# Patient Record
Sex: FEMALE | Race: WHITE
[De-identification: names, ages, dates, MRNs, and addresses within clinical notes are randomized per-mention and may not be internally consistent; named-entity substitution may affect disease eponyms.]

---

## 2019-06-14 ENCOUNTER — HOSPITAL ENCOUNTER (EMERGENCY)
Dept: HOSPITAL 47 - EC | Age: 34
Discharge: HOME | End: 2019-06-14
Payer: COMMERCIAL

## 2019-06-14 VITALS — TEMPERATURE: 97.8 F

## 2019-06-14 VITALS — SYSTOLIC BLOOD PRESSURE: 113 MMHG | HEART RATE: 95 BPM | RESPIRATION RATE: 18 BRPM | DIASTOLIC BLOOD PRESSURE: 73 MMHG

## 2019-06-14 DIAGNOSIS — G43.909: Primary | ICD-10-CM

## 2019-06-14 DIAGNOSIS — Z53.29: ICD-10-CM

## 2019-06-14 DIAGNOSIS — F41.9: ICD-10-CM

## 2019-06-14 DIAGNOSIS — Z79.899: ICD-10-CM

## 2019-06-14 PROCEDURE — 96365 THER/PROPH/DIAG IV INF INIT: CPT

## 2019-06-14 PROCEDURE — 96375 TX/PRO/DX INJ NEW DRUG ADDON: CPT

## 2019-06-14 PROCEDURE — 96361 HYDRATE IV INFUSION ADD-ON: CPT

## 2019-06-14 PROCEDURE — 99284 EMERGENCY DEPT VISIT MOD MDM: CPT

## 2019-06-14 NOTE — ED
Headache HPI





- General


Chief Complaint: Headache


Stated Complaint: migraine


Time Seen by Provider: 06/14/19 16:52


Source: RN notes reviewed, old records reviewed


Mode of arrival: ambulatory


Limitations: no limitations





- History of Present Illness


Initial Comments: 





This is a 32-year-old female the ER for evaluation she presents today for 

evaluation of headache.  History of migraines ER visits positive for migraines. 

No recent trauma no fevers.  No neck pain no pain in her back.  No neurological 

complaints.  Patient states the headache is just been progressive with nausea 

and photophobia just like her prior migraines


MD Complaint: headache, "migraine"


-: hour(s)


Onset Description: gradual


Location: frontal


Severity: moderate


Severity scale (1-10): 4


Quality: aching, throbbing


Consistency: intermittent


Improves With: nothing


Worsens With: none


Associated Symptoms: photophobia


Treatments Prior to Arrival: none





- Related Data


On Hormonal Birth Control: No


                                Home Medications











 Medication  Instructions  Recorded  Confirmed


 


Dextroamphetamine/Amphetamine 20 mg PO BID@0800,1400 06/14/19 06/14/19





[Adderall]   


 


PARoxetine [Paxil] See Taper PO DAILY 06/14/19 06/14/19


 


SUMAtriptan SUCCINATE [Imitrex] 50 mg PO DAILY PRN 06/14/19 06/14/19


 


buPROPion XL [Wellbutrin Xl] 150 mg PO DAILY 06/14/19 06/14/19











                                    Allergies











Allergy/AdvReac Type Severity Reaction Status Date / Time


 


No Known Allergies Allergy   Verified 06/14/19 17:27














Review of Systems


ROS Statement: 


Those systems with pertinent positive or pertinent negative responses have been 

documented in the HPI.





ROS Other: All systems not noted in ROS Statement are negative.





Past Medical History


Additional Past Medical History / Comment(s): migraines


History of Any Multi-Drug Resistant Organisms: None Reported


Past Surgical History: No Surgical Hx Reported


Past Psychological History: Anxiety


Smoking Status: Never smoker


Past Alcohol Use History: None Reported


Past Drug Use History: None Reported





General Exam


Limitations: no limitations


General appearance: alert, in no apparent distress


Head exam: Present: atraumatic, normocephalic, normal inspection


Eye exam: Present: normal appearance, PERRL, EOMI.  Absent: scleral icterus, 

conjunctival injection, periorbital swelling


ENT exam: Present: normal exam, mucous membranes moist


Neck exam: Present: normal inspection.  Absent: tenderness, meningismus, 

lymphadenopathy


Respiratory exam: Present: normal lung sounds bilaterally.  Absent: respiratory 

distress, wheezes, rales, rhonchi, stridor


Cardiovascular Exam: Present: regular rate, normal rhythm, normal heart sounds. 

Absent: systolic murmur, diastolic murmur, rubs, gallop, clicks


GI/Abdominal exam: Present: soft, normal bowel sounds.  Absent: distended, 

tenderness, guarding, rebound, rigid


Extremities exam: Present: normal inspection, full ROM, normal capillary refill.

 Absent: tenderness, pedal edema, joint swelling, calf tenderness


Back exam: Present: normal inspection


Neurological exam: Present: alert, oriented X3, CN II-XII intact


Psychiatric exam: Present: normal affect, normal mood


Skin exam: Present: warm, dry, intact, normal color.  Absent: rash





Course


                                   Vital Signs











  06/14/19





  16:47


 


Temperature 97.8 F


 


Pulse Rate 111 H


 


Respiratory 20





Rate 


 


Blood Pressure 118/76


 


O2 Sat by Pulse 100





Oximetry 














- Reevaluation(s)


Reevaluation #1: 





06/14/19 19:36


Medical record is reviewed including prior ER visits for similar complaint


Reevaluation #2: 





06/14/19 19:36


Patient's headache is resolved





Medical Decision Making





- Medical Decision Making





32 female the ER for evaluation of headache.  No significant findings on 

physical exam here in the ER.  Patient can be discharged home with headache 

control currently





Disposition


Clinical Impression: 


 Migraine, Headache





Disposition: HOME SELF-CARE


Condition: Good


Instructions (If sedation given, give patient instructions):  Acute Headache 

(ED)


Is patient prescribed a controlled substance at d/c from ED?: No


Referrals: 


Nonstaff,Physician [Primary Care Provider] - 1-2 days

## 2019-07-21 ENCOUNTER — HOSPITAL ENCOUNTER (EMERGENCY)
Dept: HOSPITAL 47 - EC | Age: 34
LOS: 1 days | Discharge: HOME | End: 2019-07-22
Payer: COMMERCIAL

## 2019-07-21 DIAGNOSIS — F41.9: ICD-10-CM

## 2019-07-21 DIAGNOSIS — G43.909: Primary | ICD-10-CM

## 2019-07-21 DIAGNOSIS — Z79.899: ICD-10-CM

## 2019-07-21 PROCEDURE — 96361 HYDRATE IV INFUSION ADD-ON: CPT

## 2019-07-21 PROCEDURE — 96375 TX/PRO/DX INJ NEW DRUG ADDON: CPT

## 2019-07-21 PROCEDURE — 99283 EMERGENCY DEPT VISIT LOW MDM: CPT

## 2019-07-21 PROCEDURE — 36415 COLL VENOUS BLD VENIPUNCTURE: CPT

## 2019-07-21 PROCEDURE — 96374 THER/PROPH/DIAG INJ IV PUSH: CPT

## 2019-07-21 PROCEDURE — 84703 CHORIONIC GONADOTROPIN ASSAY: CPT

## 2019-07-22 VITALS
SYSTOLIC BLOOD PRESSURE: 127 MMHG | TEMPERATURE: 98 F | RESPIRATION RATE: 18 BRPM | DIASTOLIC BLOOD PRESSURE: 49 MMHG | HEART RATE: 75 BPM

## 2019-07-22 NOTE — ED
Headache HPI





- General


Mode of arrival: ambulatory


Limitations: no limitations





<Karley Mayorga - Last Filed: 07/22/19 04:41>





<Isabella Perez - Last Filed: 07/22/19 05:43>





- General


Chief Complaint: Headache


Stated Complaint: Migraine


Time Seen by Provider: 07/22/19 01:37





- History of Present Illness


Initial Comments: 


34-year-old female patient with past medical history significant for migraine 

headaches presents to the emergency department today for evaluation of headache.

 Patient states she's had this headache since Wednesday.  States that it is 

located on the left side of her head and her eye and ear.  States it is 

radiating down her neck.  States she has light and sound sensitivity with this. 

States she has been nauseated and did vomit one time today.  Denies any 

numbness, tingling or weakness to her extremities.  Denies blurred or double 

vision.  Patient states this is consistent with her usual migraine pattern and 

she denies any new symptoms.  She denies this being the worse headache of her 

life.  Patient states that she ran out of her Imitrex on Friday.  States she has

been taking Tylenol Motrin without relief. Patient denies any recent rash, 

fever, chills, shortness breath, chest pain, abdominal pain, nausea, diarrhea, 

constipation, back pain, hematuria, dysuria, urinary urgency, urinary frequency,

or any other complaints. (Karley Mayorga)





- Related Data


                                Home Medications











 Medication  Instructions  Recorded  Confirmed


 


Dextroamphetamine/Amphetamine 20 mg PO BID@0800,1400 06/14/19 06/14/19





[Adderall]   


 


PARoxetine [Paxil] See Taper PO DAILY 06/14/19 06/14/19


 


SUMAtriptan SUCCINATE [Imitrex] 50 mg PO DAILY PRN 06/14/19 06/14/19


 


buPROPion XL [Wellbutrin Xl] 150 mg PO DAILY 06/14/19 06/14/19











                                    Allergies











Allergy/AdvReac Type Severity Reaction Status Date / Time


 


No Known Allergies Allergy   Verified 06/14/19 17:27














Review of Systems


ROS Other: All systems not noted in ROS Statement are negative.





<Karley Mayorga - Last Filed: 07/22/19 04:41>


ROS Other: All systems not noted in ROS Statement are negative.





<Isabella Perez - Last Filed: 07/22/19 05:43>


ROS Statement: 


Those systems with pertinent positive or pertinent negative responses have been 

documented in the HPI.








Past Medical History


Additional Past Medical History / Comment(s): migraines


History of Any Multi-Drug Resistant Organisms: None Reported


Past Surgical History: No Surgical Hx Reported


Past Psychological History: Anxiety


Smoking Status: Never smoker


Past Alcohol Use History: Occasional


Past Drug Use History: None Reported





<Karley Mayorga - Last Filed: 07/22/19 04:41>





General Exam


Limitations: no limitations


General appearance: alert, in no apparent distress, other (Well-developed, well-

nourished adult female patient in no acute distress.  Vital signs upon 

presentation are temperature 98.1F, pulse 96, respirations 17, blood pressure 

115/79, pulse ox 98% on room air.)


Eye exam: Present: normal appearance, PERRL, EOMI.  Absent: scleral icterus, 

conjunctival injection, nystagmus, periorbital swelling


ENT exam: Present: normal exam, normal oropharynx, mucous membranes moist, TM's 

normal bilaterally


Respiratory exam: Present: normal lung sounds bilaterally.  Absent: respiratory 

distress, wheezes, rales, rhonchi, stridor


Cardiovascular Exam: Present: regular rate, normal rhythm, normal heart sounds. 

Absent: systolic murmur, diastolic murmur, rubs, gallop, clicks


GI/Abdominal exam: Present: soft, normal bowel sounds.  Absent: distended, 

tenderness, guarding, rebound, rigid


Neurological exam: Present: alert, oriented X3, CN II-XII intact, other 

(Strength in all 4 extremities is 5/5.)


Psychiatric exam: Present: normal affect, normal mood


Skin exam: Present: warm, dry, intact, normal color.  Absent: rash





<Karley Mayorga - Last Filed: 07/22/19 04:41>





Course





<Isabella Perez - Last Filed: 07/22/19 05:43>


                                   Vital Signs











  07/21/19





  23:59


 


Temperature 98.1 F


 


Pulse Rate 96


 


Respiratory 17





Rate 


 


Blood Pressure 115/79


 


O2 Sat by Pulse 98





Oximetry 














- Reevaluation(s)


Reevaluation #1: 





07/22/19 05:43


Patient reports feeling better and is ready for discharge home at this time 

(Isabella Perez)





Medical Decision Making





<Karley Mayorga - Last Filed: 07/22/19 04:41>





- Medical Decision Making





34-year-old female patient presented to the emergency department today for 

evaluation of migraine headache.  Patient has history of migraines and states 

her symptoms are consistent with her usual migraine pattern.  She denies this 

being the worse headache of her life.  She is neurologically intact with no 

focal deficits.  Patient was given IV fluids, Toradol, Reglan, and Benadryl.  

Upon reevaluation patient reports pain decreased somewhat but is still present. 

She is requesting something more for discomfort.  We added Tylenol, Decadron, 

and Imitrex. Care is handed over to my attending Dr. Perez who will monitor 

and follow until disposition.  (Karley Mayorga)





- Lab Data


                                   Lab Results











  07/22/19 Range/Units





  01:39 


 


HCG, Qual  Not Detected  














Disposition





<Karley Mayorga - Last Filed: 07/22/19 04:41>


Is patient prescribed a controlled substance at d/c from ED?: No





<Isabella Perez - Last Filed: 07/22/19 05:43>


Clinical Impression: 


 Headache





Disposition: HOME SELF-CARE


Condition: Stable


Instructions (If sedation given, give patient instructions):  Acute Headache 

(ED)


Referrals: 


Nonstaff,Physician [REFERRING] - 1-2 days

## 2019-09-25 ENCOUNTER — HOSPITAL ENCOUNTER (OUTPATIENT)
Dept: HOSPITAL 47 - RADMRIMAIN | Age: 34
Discharge: HOME | End: 2019-09-25
Attending: PSYCHIATRY & NEUROLOGY
Payer: COMMERCIAL

## 2019-09-25 DIAGNOSIS — M54.81: ICD-10-CM

## 2019-09-25 DIAGNOSIS — G43.009: Primary | ICD-10-CM

## 2019-09-25 PROCEDURE — 70551 MRI BRAIN STEM W/O DYE: CPT

## 2019-09-25 NOTE — MR
MR brain without contrast

 

HISTORY: Migraine, occipital neuralgia

 

Multiplanar multisequence imaging through the brain

 

Correlation to MR brain 11/12/2015

 

There is no restricted diffusion. There is no hemorrhage or hydrocephalus. Brain signal is stable. Th
ere are normal vascular flow voids. Inflammatory change present in the maxillary sinus right greater 
than left, ethmoid air cells and right frontal sinus. Orbits show symmetric appearance. The corpus ca
llosum, pituitary, cervical medullary junction, cerebellopontine angles are unremarkable.

 

IMPRESSION: Stable brain MRI. Sinus disease.

## 2020-02-14 ENCOUNTER — HOSPITAL ENCOUNTER (EMERGENCY)
Dept: HOSPITAL 47 - EC | Age: 35
Discharge: HOME | End: 2020-02-14
Payer: COMMERCIAL

## 2020-02-14 VITALS — DIASTOLIC BLOOD PRESSURE: 68 MMHG | RESPIRATION RATE: 16 BRPM | HEART RATE: 86 BPM | SYSTOLIC BLOOD PRESSURE: 103 MMHG

## 2020-02-14 VITALS — TEMPERATURE: 97.6 F

## 2020-02-14 DIAGNOSIS — R00.0: ICD-10-CM

## 2020-02-14 DIAGNOSIS — Z79.899: ICD-10-CM

## 2020-02-14 DIAGNOSIS — F41.9: ICD-10-CM

## 2020-02-14 DIAGNOSIS — Z53.8: ICD-10-CM

## 2020-02-14 DIAGNOSIS — R11.0: ICD-10-CM

## 2020-02-14 DIAGNOSIS — Z86.69: ICD-10-CM

## 2020-02-14 DIAGNOSIS — E86.0: Primary | ICD-10-CM

## 2020-02-14 DIAGNOSIS — N39.0: ICD-10-CM

## 2020-02-14 LAB
ALBUMIN SERPL-MCNC: 4.4 G/DL (ref 3.5–5)
ALP SERPL-CCNC: 58 U/L (ref 38–126)
ALT SERPL-CCNC: 13 U/L (ref 4–34)
ANION GAP SERPL CALC-SCNC: 10 MMOL/L
AST SERPL-CCNC: 21 U/L (ref 14–36)
BASOPHILS # BLD AUTO: 0 K/UL (ref 0–0.2)
BASOPHILS NFR BLD AUTO: 1 %
BUN SERPL-SCNC: 17 MG/DL (ref 7–17)
CALCIUM SPEC-MCNC: 9.5 MG/DL (ref 8.4–10.2)
CHLORIDE SERPL-SCNC: 107 MMOL/L (ref 98–107)
CO2 SERPL-SCNC: 21 MMOL/L (ref 22–30)
EOSINOPHIL # BLD AUTO: 0.1 K/UL (ref 0–0.7)
EOSINOPHIL NFR BLD AUTO: 2 %
ERYTHROCYTE [DISTWIDTH] IN BLOOD BY AUTOMATED COUNT: 4.4 M/UL (ref 3.8–5.4)
ERYTHROCYTE [DISTWIDTH] IN BLOOD: 12.3 % (ref 11.5–15.5)
GLUCOSE SERPL-MCNC: 132 MG/DL (ref 74–99)
HCT VFR BLD AUTO: 38.3 % (ref 34–46)
HGB BLD-MCNC: 12.7 GM/DL (ref 11.4–16)
KETONES UR QL STRIP.AUTO: (no result)
LYMPHOCYTES # SPEC AUTO: 0.9 K/UL (ref 1–4.8)
LYMPHOCYTES NFR SPEC AUTO: 23 %
MCH RBC QN AUTO: 28.9 PG (ref 25–35)
MCHC RBC AUTO-ENTMCNC: 33.2 G/DL (ref 31–37)
MCV RBC AUTO: 87 FL (ref 80–100)
MONOCYTES # BLD AUTO: 0.1 K/UL (ref 0–1)
MONOCYTES NFR BLD AUTO: 3 %
NEUTROPHILS # BLD AUTO: 2.9 K/UL (ref 1.3–7.7)
NEUTROPHILS NFR BLD AUTO: 69 %
PH UR: 8.5 [PH] (ref 5–8)
PLATELET # BLD AUTO: 310 K/UL (ref 150–450)
POTASSIUM SERPL-SCNC: 4.2 MMOL/L (ref 3.5–5.1)
PROT SERPL-MCNC: 7.1 G/DL (ref 6.3–8.2)
PROT UR QL: (no result)
RBC UR QL: 6 /HPF (ref 0–5)
SODIUM SERPL-SCNC: 138 MMOL/L (ref 137–145)
SP GR UR: 1.03 (ref 1–1.03)
SQUAMOUS UR QL AUTO: 15 /HPF (ref 0–4)
UROBILINOGEN UR QL STRIP: <2 MG/DL (ref ?–2)
WBC # BLD AUTO: 4.1 K/UL (ref 3.8–10.6)
WBC # UR AUTO: 20 /HPF (ref 0–5)

## 2020-02-14 PROCEDURE — 85025 COMPLETE CBC W/AUTO DIFF WBC: CPT

## 2020-02-14 PROCEDURE — 80053 COMPREHEN METABOLIC PANEL: CPT

## 2020-02-14 PROCEDURE — 36415 COLL VENOUS BLD VENIPUNCTURE: CPT

## 2020-02-14 PROCEDURE — 81025 URINE PREGNANCY TEST: CPT

## 2020-02-14 PROCEDURE — 87086 URINE CULTURE/COLONY COUNT: CPT

## 2020-02-14 PROCEDURE — 96374 THER/PROPH/DIAG INJ IV PUSH: CPT

## 2020-02-14 PROCEDURE — 81001 URINALYSIS AUTO W/SCOPE: CPT

## 2020-02-14 PROCEDURE — 99284 EMERGENCY DEPT VISIT MOD MDM: CPT

## 2020-02-14 PROCEDURE — 93005 ELECTROCARDIOGRAM TRACING: CPT

## 2020-02-14 PROCEDURE — 96375 TX/PRO/DX INJ NEW DRUG ADDON: CPT

## 2020-02-14 PROCEDURE — 96361 HYDRATE IV INFUSION ADD-ON: CPT

## 2020-02-14 NOTE — ED
Dizziness HPI





- General


Chief Complaint: Dizziness


Stated Complaint: dizziness


Time Seen by Provider: 02/14/20 11:55


Source: patient


Mode of arrival: ambulatory


Limitations: no limitations





- History of Present Illness


Initial Comments: 





Patient is a 34-year-old female presenting to emergency Department with 

complaints of feeling lightheaded as well as nauseous.  Patient denies any 

injury or trauma.  She states this started today.  She has history of migraines 

and does take Paxil and Wellbutrin.  She denies any pain including no chest 

pain, shortness of breath, abdominal pain.  She denies any diarrhea.  She does 

not believe she is pregnant as she just had her menstrual cycle.  She has no 

other complaints at this time.  Upon arrival to the ER, patient was tachycardia 

at 112, rest of vitals normal.





- Related Data


                                Home Medications











 Medication  Instructions  Recorded  Confirmed


 


Dextroamphetamine/Amphetamine 20 mg PO BID@0800,1400 06/14/19 06/14/19





[Adderall]   


 


PARoxetine [Paxil] See Taper PO DAILY 06/14/19 06/14/19


 


SUMAtriptan SUCCINATE [Imitrex] 50 mg PO DAILY PRN 06/14/19 06/14/19


 


buPROPion XL [Wellbutrin Xl] 150 mg PO DAILY 06/14/19 06/14/19








                                  Previous Rx's











 Medication  Instructions  Recorded


 


Cephalexin [Keflex] 500 mg PO BID 5 Days #10 cap 02/14/20











                                    Allergies











Allergy/AdvReac Type Severity Reaction Status Date / Time


 


No Known Allergies Allergy   Verified 02/14/20 11:51














Review of Systems


ROS Statement: 


Those systems with pertinent positive or pertinent negative responses have been 

documented in the HPI.





ROS Other: All systems not noted in ROS Statement are negative.





Past Medical History


Additional Past Medical History / Comment(s): migraines


History of Any Multi-Drug Resistant Organisms: None Reported


Past Surgical History: No Surgical Hx Reported


Past Psychological History: Anxiety


Smoking Status: Never smoker


Past Alcohol Use History: Occasional


Past Drug Use History: None Reported





General Exam





- General Exam Comments


Initial Comments: 





GENERAL: 


Well-appearing, well-nourished and in no acute distress.





HEAD: 


Atraumatic, normocephalic.





EYES:


Pupils equal round and reactive to light, extraocular movements intact, sclera 

anicteric, conjunctiva are normal.





ENT: 


TMs normal, nares patent, oropharynx clear without exudates.  Moist mucous 

membranes.





NECK: 


Normal range of motion, supple without lymphadenopathy or JVD.





LUNGS:


 Breath sounds clear to auscultation bilaterally and equal.  No wheezes rales or

 rhonchi.





HEART:


Regular rate and rhythm without murmurs, rubs or gallops.





ABDOMEN: 


Soft, nontender, normoactive bowel sounds.  No guarding, no rebound.  No masses 

appreciated.





: Deferred 





EXTREMITIES: 


Normal range of motion, no pitting or edema.  No clubbing or cyanosis.





NEUROLOGICAL: 


Normal speech, normal gait.





PSYCH:


Normal mood, normal affect.





SKIN:


 Warm, Dry, normal turgor, no rashes or lesions noted.


Limitations: no limitations





Course


                                   Vital Signs











  02/14/20 02/14/20





  11:51 14:39


 


Temperature 97.6 F 


 


Pulse Rate 112 H 86


 


Respiratory 18 16





Rate  


 


Blood Pressure 95/68 103/68


 


O2 Sat by Pulse 100 100





Oximetry  














EKG Findings





- EKG Comments:


EKG Findings:: Ventricular rate 85, DC interval 124, .  Normal sinus 

rhythm, prolonged QT, no acute ST segment changes.





Medical Decision Making





- Medical Decision Making





She is a 34-year-old female presenting with lightheadedness and nausea times 

today.  She was slightly tachycardia and arrival, afebrile.  She denies any pain

 or shortness of breath.  Patient's labs show no acute abnormalities, UA is 

significant for infection and dehydration.  She is not pregnant.  Patient was 

given a liter and half of fluids as well as Zofran and reports improvement in 

her symptoms.  Her vital signs have stabilized.  Patient was given Rocephin and 

will be started on Keflex as outpatient.  She will follow-up with PCP.  Urine 

culture is pending at this time.  She is stable for discharge and she is in 

agreement this plan of care.  Return parameters were discussed with the patient 

and she verbalized understanding case discussed with Dr. Wolff.





- Lab Data


Result diagrams: 


                                 02/14/20 12:18





                                 02/14/20 12:18


                                   Lab Results











  02/14/20 02/14/20 02/14/20 Range/Units





  12:18 12:18 13:03 


 


WBC   4.1   (3.8-10.6)  k/uL


 


RBC   4.40   (3.80-5.40)  m/uL


 


Hgb   12.7   (11.4-16.0)  gm/dL


 


Hct   38.3   (34.0-46.0)  %


 


MCV   87.0   (80.0-100.0)  fL


 


MCH   28.9   (25.0-35.0)  pg


 


MCHC   33.2   (31.0-37.0)  g/dL


 


RDW   12.3   (11.5-15.5)  %


 


Plt Count   310   (150-450)  k/uL


 


Neutrophils %   69   %


 


Lymphocytes %   23   %


 


Monocytes %   3   %


 


Eosinophils %   2   %


 


Basophils %   1   %


 


Neutrophils #   2.9   (1.3-7.7)  k/uL


 


Lymphocytes #   0.9 L   (1.0-4.8)  k/uL


 


Monocytes #   0.1   (0-1.0)  k/uL


 


Eosinophils #   0.1   (0-0.7)  k/uL


 


Basophils #   0.0   (0-0.2)  k/uL


 


Sodium  138    (137-145)  mmol/L


 


Potassium  4.2    (3.5-5.1)  mmol/L


 


Chloride  107    ()  mmol/L


 


Carbon Dioxide  21 L    (22-30)  mmol/L


 


Anion Gap  10    mmol/L


 


BUN  17    (7-17)  mg/dL


 


Creatinine  0.57    (0.52-1.04)  mg/dL


 


Est GFR (CKD-EPI)AfAm  >90    (>60 ml/min/1.73 sqM)  


 


Est GFR (CKD-EPI)NonAf  >90    (>60 ml/min/1.73 sqM)  


 


Glucose  132 H    (74-99)  mg/dL


 


Calcium  9.5    (8.4-10.2)  mg/dL


 


Total Bilirubin  0.6    (0.2-1.3)  mg/dL


 


AST  21    (14-36)  U/L


 


ALT  13    (4-34)  U/L


 


Alkaline Phosphatase  58    ()  U/L


 


Total Protein  7.1    (6.3-8.2)  g/dL


 


Albumin  4.4    (3.5-5.0)  g/dL


 


Urine Color     


 


Urine Appearance     (Clear)  


 


Urine pH     (5.0-8.0)  


 


Ur Specific Gravity     (1.001-1.035)  


 


Urine Protein     (Negative)  


 


Urine Glucose (UA)     (Negative)  


 


Urine Ketones     (Negative)  


 


Urine Blood     (Negative)  


 


Urine Nitrite     (Negative)  


 


Urine Bilirubin     (Negative)  


 


Urine Urobilinogen     (<2.0)  mg/dL


 


Ur Leukocyte Esterase     (Negative)  


 


Urine RBC     (0-5)  /hpf


 


Urine WBC     (0-5)  /hpf


 


Ur Squamous Epith Cells     (0-4)  /hpf


 


Amorphous Sediment     (None)  /hpf


 


Urine Bacteria     (None)  /hpf


 


Urine Mucus     (None)  /hpf


 


Urine HCG, Qual    Not Detected  (Not Detectd)  














  02/14/20 Range/Units





  13:03 


 


WBC   (3.8-10.6)  k/uL


 


RBC   (3.80-5.40)  m/uL


 


Hgb   (11.4-16.0)  gm/dL


 


Hct   (34.0-46.0)  %


 


MCV   (80.0-100.0)  fL


 


MCH   (25.0-35.0)  pg


 


MCHC   (31.0-37.0)  g/dL


 


RDW   (11.5-15.5)  %


 


Plt Count   (150-450)  k/uL


 


Neutrophils %   %


 


Lymphocytes %   %


 


Monocytes %   %


 


Eosinophils %   %


 


Basophils %   %


 


Neutrophils #   (1.3-7.7)  k/uL


 


Lymphocytes #   (1.0-4.8)  k/uL


 


Monocytes #   (0-1.0)  k/uL


 


Eosinophils #   (0-0.7)  k/uL


 


Basophils #   (0-0.2)  k/uL


 


Sodium   (137-145)  mmol/L


 


Potassium   (3.5-5.1)  mmol/L


 


Chloride   ()  mmol/L


 


Carbon Dioxide   (22-30)  mmol/L


 


Anion Gap   mmol/L


 


BUN   (7-17)  mg/dL


 


Creatinine   (0.52-1.04)  mg/dL


 


Est GFR (CKD-EPI)AfAm   (>60 ml/min/1.73 sqM)  


 


Est GFR (CKD-EPI)NonAf   (>60 ml/min/1.73 sqM)  


 


Glucose   (74-99)  mg/dL


 


Calcium   (8.4-10.2)  mg/dL


 


Total Bilirubin   (0.2-1.3)  mg/dL


 


AST   (14-36)  U/L


 


ALT   (4-34)  U/L


 


Alkaline Phosphatase   ()  U/L


 


Total Protein   (6.3-8.2)  g/dL


 


Albumin   (3.5-5.0)  g/dL


 


Urine Color  Yellow  


 


Urine Appearance  Turbid H  (Clear)  


 


Urine pH  8.5 H  (5.0-8.0)  


 


Ur Specific Gravity  1.028  (1.001-1.035)  


 


Urine Protein  2+ H  (Negative)  


 


Urine Glucose (UA)  Negative  (Negative)  


 


Urine Ketones  1+ H  (Negative)  


 


Urine Blood  Small H  (Negative)  


 


Urine Nitrite  Negative  (Negative)  


 


Urine Bilirubin  Negative  (Negative)  


 


Urine Urobilinogen  <2.0  (<2.0)  mg/dL


 


Ur Leukocyte Esterase  Negative  (Negative)  


 


Urine RBC  6 H  (0-5)  /hpf


 


Urine WBC  20 H  (0-5)  /hpf


 


Ur Squamous Epith Cells  15 H  (0-4)  /hpf


 


Amorphous Sediment  Few H  (None)  /hpf


 


Urine Bacteria  Occasional H  (None)  /hpf


 


Urine Mucus  Many H  (None)  /hpf


 


Urine HCG, Qual   (Not Detectd)  














Disposition


Clinical Impression: 


 Dehydration, UTI (urinary tract infection)





Disposition: HOME SELF-CARE


Condition: Stable


Instructions (If sedation given, give patient instructions):  Dehydration (ED), 

Urinary Tract Infection in Women (ED)


Additional Instructions: 


Please return to the Emergency Department if symptoms worsen or any other 

concerns.


Follow-up with PCP.  


Take antibiotics as prescribed.  


Increase fluid intake.


Prescriptions: 


Cephalexin [Keflex] 500 mg PO BID 5 Days #10 cap


Is patient prescribed a controlled substance at d/c from ED?: No


Referrals: 


None,Stated [Primary Care Provider] - 1-2 days